# Patient Record
Sex: FEMALE | URBAN - METROPOLITAN AREA
[De-identification: names, ages, dates, MRNs, and addresses within clinical notes are randomized per-mention and may not be internally consistent; named-entity substitution may affect disease eponyms.]

---

## 2023-05-23 ENCOUNTER — EMERGENCY (EMERGENCY)
Facility: HOSPITAL | Age: 53
LOS: 1 days | Discharge: LEFT BEFORE DISCHARGE | End: 2023-05-23
Attending: EMERGENCY MEDICINE
Payer: SELF-PAY

## 2023-05-23 VITALS
WEIGHT: 285.06 LBS | DIASTOLIC BLOOD PRESSURE: 95 MMHG | SYSTOLIC BLOOD PRESSURE: 161 MMHG | HEART RATE: 60 BPM | TEMPERATURE: 98 F | OXYGEN SATURATION: 98 % | HEIGHT: 68 IN | RESPIRATION RATE: 18 BRPM

## 2023-05-23 PROCEDURE — 99283 EMERGENCY DEPT VISIT LOW MDM: CPT

## 2023-05-23 PROCEDURE — 99282 EMERGENCY DEPT VISIT SF MDM: CPT

## 2023-05-23 NOTE — ED ADULT TRIAGE NOTE - CHIEF COMPLAINT QUOTE
pt c/o RLQ and LLQ pain x3 weeks w/ nausea, pain increasingly worsening, pt took advil w/ no relief.,, pt has pmhx of endometria and states "pain feels similar". pt denies pain or difficulty urinating

## 2023-05-23 NOTE — ED STATDOCS - OBJECTIVE STATEMENT
53 y/o female with PMHx of diverticulitis, endometrioma presents to the ED c/o a month of lower abdominal pain, states she had pelvic MRI, saw GYN was told to get abdominal MRI. Pt notes hx of endometrioma in past.

## 2023-05-23 NOTE — ED STATDOCS - CLINICAL SUMMARY MEDICAL DECISION MAKING FREE TEXT BOX
pt with 1 month of abdominal pain,had pelvic MRI, was advised to get abdominal MRI, informed pt that we would be unable to perform that test here today, however offered, labs, UA, CT abdomen, treat pain, pt declined and eloped pt with 1 month of abdominal pain,had pelvic MRI, was advised to get abdominal MRI, informed pt that we would be unlikely to perform that test here today, however offered, labs, UA, CT abdomen, treat pain, pt declined and eloped

## 2023-05-23 NOTE — ED STATDOCS - NS ED ROS FT
Review of Systems  •	CONSTITUTIONAL - no  fever, no diaphoresis, no weight change  •	SKIN - no rash  •	HEMATOLOGIC - no bleeding, no bruising  •	EYES - no eye pain, no blurred vision  •	ENT - no change in hearing, no pain  •	RESPIRATORY - no shortness of breath, no cough  •	CARDIAC - no chest pain, no palpitations  •	GI - + abd pain, + nausea, no vomiting, no diarrhea, no constipation, no bleeding  •	GENITO-URINARY - no discharge, no dysuria; no hematuria,   •	ENDO - no polydipsia, no polyuria, no heat/no cold intolerance  •	MUSCULOSKELETAL - no joint pain, no swelling, no redness  •	NEUROLOGIC - no weakness, no headache, no anesthesia, no paresthesias  •	PSYCH - no anxiety, non suicidal, non homicidal, no hallucination, no depression